# Patient Record
Sex: MALE | Employment: FULL TIME | URBAN - METROPOLITAN AREA
[De-identification: names, ages, dates, MRNs, and addresses within clinical notes are randomized per-mention and may not be internally consistent; named-entity substitution may affect disease eponyms.]

---

## 2024-11-28 ENCOUNTER — OFFICE VISIT (OUTPATIENT)
Dept: URGENT CARE | Age: 44
End: 2024-11-28
Payer: COMMERCIAL

## 2024-11-28 VITALS
WEIGHT: 180 LBS | HEART RATE: 105 BPM | DIASTOLIC BLOOD PRESSURE: 89 MMHG | TEMPERATURE: 97.7 F | SYSTOLIC BLOOD PRESSURE: 149 MMHG | RESPIRATION RATE: 16 BRPM | OXYGEN SATURATION: 98 %

## 2024-11-28 DIAGNOSIS — J02.9 SORE THROAT: ICD-10-CM

## 2024-11-28 DIAGNOSIS — J06.9 UPPER RESPIRATORY TRACT INFECTION, UNSPECIFIED TYPE: Primary | ICD-10-CM

## 2024-11-28 LAB — POC RAPID STREP: NEGATIVE

## 2024-11-28 RX ORDER — AZITHROMYCIN 250 MG/1
TABLET, FILM COATED ORAL
Qty: 6 TABLET | Refills: 0 | Status: SHIPPED | OUTPATIENT
Start: 2024-11-28 | End: 2024-12-03

## 2024-11-28 ASSESSMENT — PAIN SCALES - GENERAL: PAINLEVEL_OUTOF10: 8

## 2024-11-28 ASSESSMENT — ENCOUNTER SYMPTOMS
SINUS PRESSURE: 1
SINUS PAIN: 1
CONSTITUTIONAL NEGATIVE: 1
SORE THROAT: 1

## 2024-11-28 NOTE — PROGRESS NOTES
Subjective   Patient ID: Simone Dorsey is a 44 y.o. male. They present today with a chief complaint of Sore Throat (ST X 2 days. ).    History of Present Illness    Sore Throat   Associated symptoms include congestion.     Patient presents to the urgent care for a chief complaint of sore throat and sinus congestion over the last 2 days.  No recorded fevers, no report of previous URI no other associated symptoms patient states that sore throat is worse upon waking in the morning slightly subsides throughout the day but present all day.  States that his nasal discharge is thick.  No report of cough    Past Medical History  Allergies as of 11/28/2024    (No Known Allergies)       (Not in a hospital admission)       No past medical history on file.    No past surgical history on file.         Review of Systems  Review of Systems   Constitutional: Negative.    HENT:  Positive for congestion, sinus pressure, sinus pain and sore throat.                                   Objective    Vitals:    11/28/24 1054   BP: 149/89   Pulse: 105   Resp: 16   Temp: 36.5 °C (97.7 °F)   SpO2: 98%   Weight: 81.6 kg (180 lb)     No LMP for male patient.    Physical Exam  Vitals and nursing note reviewed.   Constitutional:       General: He is not in acute distress.     Appearance: Normal appearance. He is not ill-appearing, toxic-appearing or diaphoretic.   HENT:      Head: Normocephalic and atraumatic.      Right Ear: Tympanic membrane normal. There is no impacted cerumen.      Left Ear: Tympanic membrane normal. There is no impacted cerumen.      Nose: Congestion present.      Mouth/Throat:      Mouth: Mucous membranes are moist.      Pharynx: No oropharyngeal exudate or posterior oropharyngeal erythema.   Cardiovascular:      Rate and Rhythm: Normal rate and regular rhythm.      Pulses: Normal pulses.   Pulmonary:      Effort: Pulmonary effort is normal.      Breath sounds: Normal breath sounds.   Musculoskeletal:      Cervical back:  Normal range of motion and neck supple. No rigidity or tenderness.   Lymphadenopathy:      Cervical: No cervical adenopathy.   Neurological:      General: No focal deficit present.      Mental Status: He is alert and oriented to person, place, and time.   Psychiatric:         Mood and Affect: Mood normal.         Behavior: Behavior normal.         Procedures    Point of Care Test & Imaging Results from this visit  Results for orders placed or performed in visit on 11/28/24   POCT rapid strep A manually resulted   Result Value Ref Range    POC Rapid Strep Negative Negative      No results found.    Diagnostic study results (if any) were reviewed by Randall Morales PA-C.    Assessment/Plan   Allergies, medications, history, and pertinent labs/EKGs/Imaging reviewed by Randall Morales PA-C.     Medical Decision Making  I did discuss with patient that rapid strep is negative patient states does have a concern for sinus infection, I did discuss with patient most likely viral etiology due to symptomology and duration, I am agreeable to place patient on azithromycin which would cover for acute sinusitis but also did discuss with patient if no regression of symptoms it is most likely viral upper respiratory infection.  Patient verbalized understanding and is agreeable to plan discharge from urgent care A+O x 4 stable condition no signs of respiratory distress    Orders and Diagnoses  Diagnoses and all orders for this visit:  Upper respiratory tract infection, unspecified type  -     azithromycin (Zithromax) 250 mg tablet; Take 2 tabs (500 mg) by mouth today, than 1 daily for 4 days.  Sore throat  -     POCT rapid strep A manually resulted      Medical Admin Record      Patient disposition: Home    Electronically signed by Randall Morales PA-C  11:24 AM